# Patient Record
Sex: FEMALE | Race: WHITE | Employment: OTHER | URBAN - METROPOLITAN AREA
[De-identification: names, ages, dates, MRNs, and addresses within clinical notes are randomized per-mention and may not be internally consistent; named-entity substitution may affect disease eponyms.]

---

## 2021-07-08 ENCOUNTER — APPOINTMENT (OUTPATIENT)
Dept: GENERAL RADIOLOGY | Age: 71
End: 2021-07-08
Payer: MEDICARE

## 2021-07-08 ENCOUNTER — HOSPITAL ENCOUNTER (EMERGENCY)
Age: 71
Discharge: HOME | End: 2021-07-08
Payer: MEDICARE

## 2021-07-08 VITALS
TEMPERATURE: 98.4 F | SYSTOLIC BLOOD PRESSURE: 157 MMHG | HEIGHT: 61 IN | BODY MASS INDEX: 36.84 KG/M2 | DIASTOLIC BLOOD PRESSURE: 84 MMHG | WEIGHT: 195.11 LBS | OXYGEN SATURATION: 97 % | HEART RATE: 87 BPM | RESPIRATION RATE: 16 BRPM

## 2021-07-08 DIAGNOSIS — S61.422A LACERATION OF LEFT HAND WITH FOREIGN BODY, INITIAL ENCOUNTER: Primary | ICD-10-CM

## 2021-07-08 PROCEDURE — 73130 X-RAY EXAM OF HAND: CPT

## 2021-07-08 PROCEDURE — 6360000002 HC RX W HCPCS: Performed by: PHYSICIAN ASSISTANT

## 2021-07-08 PROCEDURE — 6370000000 HC RX 637 (ALT 250 FOR IP): Performed by: PHYSICIAN ASSISTANT

## 2021-07-08 PROCEDURE — 90715 TDAP VACCINE 7 YRS/> IM: CPT | Performed by: PHYSICIAN ASSISTANT

## 2021-07-08 PROCEDURE — 12042 INTMD RPR N-HF/GENIT2.6-7.5: CPT

## 2021-07-08 PROCEDURE — 99283 EMERGENCY DEPT VISIT LOW MDM: CPT

## 2021-07-08 PROCEDURE — 90471 IMMUNIZATION ADMIN: CPT | Performed by: PHYSICIAN ASSISTANT

## 2021-07-08 RX ORDER — HYDROCODONE BITARTRATE AND ACETAMINOPHEN 5; 325 MG/1; MG/1
1 TABLET ORAL ONCE
Status: COMPLETED | OUTPATIENT
Start: 2021-07-08 | End: 2021-07-08

## 2021-07-08 RX ORDER — LIDOCAINE HYDROCHLORIDE 20 MG/ML
15 SOLUTION OROPHARYNGEAL ONCE
Status: COMPLETED | OUTPATIENT
Start: 2021-07-08 | End: 2021-07-08

## 2021-07-08 RX ADMIN — TETANUS TOXOID, REDUCED DIPHTHERIA TOXOID AND ACELLULAR PERTUSSIS VACCINE, ADSORBED 0.5 ML: 5; 2.5; 8; 8; 2.5 SUSPENSION INTRAMUSCULAR at 18:24

## 2021-07-08 RX ADMIN — HYDROCODONE BITARTRATE AND ACETAMINOPHEN 1 TABLET: 5; 325 TABLET ORAL at 17:24

## 2021-07-08 RX ADMIN — LIDOCAINE HYDROCHLORIDE 15 ML: 20 SOLUTION ORAL; TOPICAL at 17:31

## 2021-07-08 ASSESSMENT — PAIN DESCRIPTION - ORIENTATION: ORIENTATION: LEFT

## 2021-07-08 ASSESSMENT — PAIN DESCRIPTION - DESCRIPTORS: DESCRIPTORS: ACHING

## 2021-07-08 ASSESSMENT — PAIN SCALES - GENERAL
PAINLEVEL_OUTOF10: 2
PAINLEVEL_OUTOF10: 2

## 2021-07-08 ASSESSMENT — PAIN DESCRIPTION - PAIN TYPE: TYPE: ACUTE PAIN

## 2021-07-08 ASSESSMENT — PAIN DESCRIPTION - LOCATION: LOCATION: HAND

## 2021-07-08 NOTE — ED PROVIDER NOTES
**ADVANCED PRACTICE PROVIDER, I HAVE EVALUATED THIS PATIENT**        [address] [facility] ENCOUNTER      Pt Name: Pao Cedillo  LTS:[id number]  [place] 1950  Date of evaluation: 7/8/2021  Provider: Yoshi Gamez PA-C      Chief Complaint:    Chief Complaint   Patient presents with    Laceration     laceration top of lf hand today  hand hit side of garage wall when backing car in         Nursing Notes, Past Medical Hx, Past Surgical Hx, Social Hx, Allergies, and Family Hx were all reviewed and agreed with or any disagreements were addressed in the HPI.    HPI: (Location, Duration, Timing, Severity, Quality, Assoc Sx, Context, Modifying factors)    Chief Complaint of left dorsal hand laceration    This is a  69 y.o. female who presents indicating the accidental injury to the dorsal portion of left hand was about an hour ago. She states that she was backing up her vehicle into the garage, reached out through the window to grab the mirror to pull it in and unfortunately got her hand stuck between the mirror and the stucco interior of the garage. She states that it was just momentary and then she was able to pull the vehicle forward again to get her handout but in the meantime she has moderately severe achy constant nonradiating sharp local pain to the dorsal left hand with increasing swelling over the course of the last hour. No medication taken for the tenderness so far. There is some mild seeping blood and a bit of a skin tear or cat as well. No finger numbness or tingling or acute loss range of motion or strength. No wrist pain or tenderness elbow pain or shoulder pain. No radiating pain. Positive for recent tetanus within the last 5 years. PastMedical/Surgical History:  No pertinent previous surgery to this area.     Patient mentions that she has an allergy to latex and to codeine but has taken Vicodin with no difficulty in the weakness. Coordination: Coordination normal.      Gait: Gait normal.   Psychiatric:         Mood and Affect: Mood normal.         Behavior: Behavior normal.         Thought Content: Thought content normal.         Judgment: Judgment normal.         MEDICAL DECISION MAKING    Vitals:    Vitals:    07/08/21 1700   BP: (!) 157/84   Pulse: 87   Resp: 16   Temp: 98.4 °F (36.9 °C)   SpO2: 97%   Weight: 195 lb 1.7 oz (88.5 kg)   Height: 5' 1\" (1.549 m)       LABS:Labs Reviewed - No data to display     Remainder of labs reviewed and were negative at this time or not returned at the time of this note. RADIOLOGY:   Non-plain film images such as CT, Ultrasound and MRI are read by the radiologist. Tavo Veronica PA-C have directly visualized the radiologic plain film image(s) with the below findings:      Interpretation per the Radiologist below, if available at the time of this note:    XR HAND LEFT (MIN 3 VIEWS)   Final Result   Mild osteoarthritic changes throughout the digits and diffuse osteopenia with   no acute bony abnormality      Moderate soft tissue swelling along the dorsum of the hand extending distally   with punctate calcifications or possible foreign body seen just beneath the   skin in the area. Recommend clinical follow-up. No results found. MEDICAL DECISION MAKING / ED COURSE:      PROCEDURES:   Lac Repair    Date/Time: 7/8/2021 6:22 PM  Performed by: Melinda Singh PA-C  Authorized by: Melinda Singh PA-C     Consent:     Consent obtained:  Verbal    Consent given by:  Patient  Anesthesia (see MAR for exact dosages):      Anesthesia method:  Topical application and local infiltration    Local anesthetic:  Lidocaine 1% WITH epi  Laceration details:     Location:  Hand    Hand location:  L hand, dorsum    Length (cm):  5    Depth (mm):  2  Pre-procedure details:     Preparation:  Patient was prepped and draped in usual sterile fashion and imaging obtained to evaluate for foreign bodies  Exploration:     Hemostasis achieved with:  Direct pressure    Wound exploration: wound explored through full range of motion and entire depth of wound probed and visualized      Wound extent: areolar tissue violated, foreign bodies/material and vascular damage      Wound extent: no fascia violation noted, no muscle damage noted, no nerve damage noted, no tendon damage noted and no underlying fracture noted      Foreign bodies/material:  A number of small grains of sand/stucco particles were gently debrided from under the skin as well as picked out with forceps    Contaminated: yes    Treatment:     Area cleansed with:  Hibiclens and saline    Amount of cleaning:  Extensive    Visualized foreign bodies/material removed: yes    Skin repair:     Repair method:  Sutures and Steri-Strips    Suture size:  6-0    Suture material:  Prolene    Suture technique:  Simple interrupted    Number of sutures:  5    Number of Steri-Strips:  4  Approximation:     Approximation:  Close  Post-procedure details:     Dressing:  Non-adherent dressing    Patient tolerance of procedure: Tolerated well, no immediate complications        None    Patient was given:  Medications   Tetanus-Diphth-Acell Pertussis (BOOSTRIX) injection 0.5 mL (has no administration in time range)   HYDROcodone-acetaminophen (NORCO) 5-325 MG per tablet 1 tablet (1 tablet Oral Given 7/8/21 1724)   lidocaine viscous hcl (XYLOCAINE) 2 % solution 15 mL (15 mLs Mouth/Throat Given 7/8/21 1731)     This patient presents as above and evaluation and treatment is begun including some medication given for comfort. Imaging obtained with results as above. Tetanus shot updated. Excellent wound care given for this patient with very good results of foreign bodies removed, skin very well approximated and sutured and Steri-Stripped in place.   Conservative home care is recommended at this time and patient verbalizes understanding and agreement with the above and the following discharge home plan. The patient tolerated their visit well. I evaluated the patient. The physician was available for consultation as needed. The patient and / or the family were informed of the results of any tests, a time was given to answer questions, a plan was proposed and they agreed with plan. Home in good condition to keep the area clean dry and covered with new bandage a couple times daily as needed for the first few days.  Then monitor for gradual improvement.  May also do cold compresses the first couple of days and then switch to warm compresses to help with bruise reabsorption.  Sutures need removed in about 10 days.  Return to the emergency department for any emergency worsen or concern. CLINICAL IMPRESSION:  1.  Laceration of left hand with foreign body, initial encounter        DISPOSITION Decision To Discharge 07/08/2021 06:16:03 PM      PATIENT REFERRED TO:  [facility]) Pre-Services  [phone]    With your primary care provider in about 10 days for suture removal      DISCHARGE MEDICATIONS:  New Prescriptions    No medications on file       DISCONTINUED MEDICATIONS:  Discontinued Medications    No medications on file              (Please note the MDM and HPI sections of this note were completed with a voice recognition program.  Efforts were made to edit the dictations but occasionally words are mis-transcribed.)    Electronically signed, Carline Jackson PA-C,          Carline Jackson PA-C  07/08/21 5361

## 2021-07-08 NOTE — ED NOTES
Pt  harshad suturing of laceration 1\" top of lf hand   Adaptic dry sterile dressing applied     Ana M Cuellar RN  07/08/21 0511

## 2022-09-21 ENCOUNTER — HOSPITAL ENCOUNTER (OUTPATIENT)
Age: 72
Setting detail: OUTPATIENT SURGERY
Discharge: HOME | End: 2022-09-21
Attending: INTERNAL MEDICINE | Admitting: INTERNAL MEDICINE
Payer: MEDICARE

## 2022-09-21 ENCOUNTER — ANESTHESIA EVENT (OUTPATIENT)
Dept: ENDOSCOPY | Age: 72
End: 2022-09-21
Payer: MEDICARE

## 2022-09-21 ENCOUNTER — ANESTHESIA (OUTPATIENT)
Dept: ENDOSCOPY | Age: 72
End: 2022-09-21
Payer: MEDICARE

## 2022-09-21 VITALS
HEIGHT: 61 IN | BODY MASS INDEX: 37.19 KG/M2 | RESPIRATION RATE: 16 BRPM | WEIGHT: 197 LBS | HEART RATE: 67 BPM | OXYGEN SATURATION: 97 % | DIASTOLIC BLOOD PRESSURE: 71 MMHG | SYSTOLIC BLOOD PRESSURE: 124 MMHG | TEMPERATURE: 97.3 F

## 2022-09-21 DIAGNOSIS — Z12.11 SCREEN FOR COLON CANCER: ICD-10-CM

## 2022-09-21 PROCEDURE — 88305 TISSUE EXAM BY PATHOLOGIST: CPT

## 2022-09-21 PROCEDURE — 7100000010 HC PHASE II RECOVERY - FIRST 15 MIN: Performed by: INTERNAL MEDICINE

## 2022-09-21 PROCEDURE — 2709999900 HC NON-CHARGEABLE SUPPLY: Performed by: INTERNAL MEDICINE

## 2022-09-21 PROCEDURE — 3700000001 HC ADD 15 MINUTES (ANESTHESIA): Performed by: INTERNAL MEDICINE

## 2022-09-21 PROCEDURE — 3609010600 HC COLONOSCOPY POLYPECTOMY SNARE/COLD BIOPSY: Performed by: INTERNAL MEDICINE

## 2022-09-21 PROCEDURE — 2500000003 HC RX 250 WO HCPCS

## 2022-09-21 PROCEDURE — 2580000003 HC RX 258: Performed by: STUDENT IN AN ORGANIZED HEALTH CARE EDUCATION/TRAINING PROGRAM

## 2022-09-21 PROCEDURE — 3700000000 HC ANESTHESIA ATTENDED CARE: Performed by: INTERNAL MEDICINE

## 2022-09-21 PROCEDURE — 6360000002 HC RX W HCPCS

## 2022-09-21 PROCEDURE — 7100000011 HC PHASE II RECOVERY - ADDTL 15 MIN: Performed by: INTERNAL MEDICINE

## 2022-09-21 RX ORDER — SODIUM CHLORIDE 9 MG/ML
INJECTION, SOLUTION INTRAVENOUS CONTINUOUS
Status: DISCONTINUED | OUTPATIENT
Start: 2022-09-21 | End: 2022-09-21 | Stop reason: HOSPADM

## 2022-09-21 RX ORDER — SODIUM CHLORIDE 0.9 % (FLUSH) 0.9 %
5-40 SYRINGE (ML) INJECTION EVERY 12 HOURS SCHEDULED
Status: DISCONTINUED | OUTPATIENT
Start: 2022-09-21 | End: 2022-09-21 | Stop reason: HOSPADM

## 2022-09-21 RX ORDER — CELECOXIB 200 MG/1
200 CAPSULE ORAL 2 TIMES DAILY
COMMUNITY
Start: 2021-11-02

## 2022-09-21 RX ORDER — LISINOPRIL 10 MG/1
TABLET ORAL
COMMUNITY
Start: 2022-09-02

## 2022-09-21 RX ORDER — FERROUS SULFATE 325(65) MG
325 TABLET ORAL 2 TIMES DAILY
COMMUNITY
Start: 2021-11-02

## 2022-09-21 RX ORDER — ATORVASTATIN CALCIUM 20 MG/1
TABLET, FILM COATED ORAL
COMMUNITY
Start: 2022-09-07

## 2022-09-21 RX ORDER — SODIUM CHLORIDE 9 MG/ML
INJECTION, SOLUTION INTRAVENOUS PRN
Status: DISCONTINUED | OUTPATIENT
Start: 2022-09-21 | End: 2022-09-21 | Stop reason: HOSPADM

## 2022-09-21 RX ORDER — METHOCARBAMOL 750 MG/1
750 TABLET, FILM COATED ORAL 3 TIMES DAILY PRN
COMMUNITY
Start: 2021-11-18

## 2022-09-21 RX ORDER — LIDOCAINE HYDROCHLORIDE 20 MG/ML
INJECTION, SOLUTION EPIDURAL; INFILTRATION; INTRACAUDAL; PERINEURAL PRN
Status: DISCONTINUED | OUTPATIENT
Start: 2022-09-21 | End: 2022-09-21 | Stop reason: SDUPTHER

## 2022-09-21 RX ORDER — LIDOCAINE 50 MG/G
1 PATCH TOPICAL EVERY 24 HOURS
COMMUNITY
Start: 2021-12-16

## 2022-09-21 RX ORDER — POLYETHYLENE GLYCOL 3350 17 G/17G
17 POWDER, FOR SOLUTION ORAL NIGHTLY
COMMUNITY

## 2022-09-21 RX ORDER — ACETAMINOPHEN 500 MG
1000 TABLET ORAL EVERY 8 HOURS PRN
COMMUNITY
Start: 2020-12-18

## 2022-09-21 RX ORDER — LISINOPRIL 10 MG/1
TABLET ORAL
COMMUNITY
Start: 2021-06-07

## 2022-09-21 RX ORDER — PROPOFOL 10 MG/ML
INJECTION, EMULSION INTRAVENOUS PRN
Status: DISCONTINUED | OUTPATIENT
Start: 2022-09-21 | End: 2022-09-21 | Stop reason: SDUPTHER

## 2022-09-21 RX ORDER — MELOXICAM 15 MG/1
15 TABLET ORAL DAILY
COMMUNITY
Start: 2021-04-22

## 2022-09-21 RX ORDER — ONDANSETRON 4 MG/1
4 TABLET, FILM COATED ORAL EVERY 8 HOURS PRN
COMMUNITY
Start: 2021-11-18

## 2022-09-21 RX ORDER — ATORVASTATIN CALCIUM 20 MG/1
TABLET, FILM COATED ORAL
COMMUNITY
Start: 2020-12-04

## 2022-09-21 RX ORDER — SODIUM CHLORIDE 0.9 % (FLUSH) 0.9 %
5-40 SYRINGE (ML) INJECTION PRN
Status: DISCONTINUED | OUTPATIENT
Start: 2022-09-21 | End: 2022-09-21 | Stop reason: HOSPADM

## 2022-09-21 RX ADMIN — PROPOFOL 20 MG: 10 INJECTION, EMULSION INTRAVENOUS at 14:33

## 2022-09-21 RX ADMIN — PROPOFOL 20 MG: 10 INJECTION, EMULSION INTRAVENOUS at 14:39

## 2022-09-21 RX ADMIN — PROPOFOL 20 MG: 10 INJECTION, EMULSION INTRAVENOUS at 14:45

## 2022-09-21 RX ADMIN — PROPOFOL 50 MG: 10 INJECTION, EMULSION INTRAVENOUS at 14:26

## 2022-09-21 RX ADMIN — PROPOFOL 30 MG: 10 INJECTION, EMULSION INTRAVENOUS at 14:29

## 2022-09-21 RX ADMIN — PROPOFOL 80 MG: 10 INJECTION, EMULSION INTRAVENOUS at 14:16

## 2022-09-21 RX ADMIN — PROPOFOL 20 MG: 10 INJECTION, EMULSION INTRAVENOUS at 14:36

## 2022-09-21 RX ADMIN — LIDOCAINE HYDROCHLORIDE 100 MG: 20 INJECTION, SOLUTION EPIDURAL; INFILTRATION; INTRACAUDAL; PERINEURAL at 14:16

## 2022-09-21 RX ADMIN — PROPOFOL 50 MG: 10 INJECTION, EMULSION INTRAVENOUS at 14:31

## 2022-09-21 RX ADMIN — SODIUM CHLORIDE: 9 INJECTION, SOLUTION INTRAVENOUS at 13:50

## 2022-09-21 RX ADMIN — PROPOFOL 20 MG: 10 INJECTION, EMULSION INTRAVENOUS at 14:42

## 2022-09-21 ASSESSMENT — PAIN - FUNCTIONAL ASSESSMENT: PAIN_FUNCTIONAL_ASSESSMENT: 0-10

## 2022-09-21 ASSESSMENT — PAIN SCALES - GENERAL
PAINLEVEL_OUTOF10: 0
PAINLEVEL_OUTOF10: 0

## 2022-09-21 NOTE — OP NOTE
Colonoscopy Procedure Note      Patient: Mookie Begum  : 1950  Acct#:     Procedure: Colonoscopy with cold snare polypectomy x 1    Date:  2022    Surgeon:  Kathryn Diaz MD    Referring Physician:  Troy He MD    Preoperative Diagnosis:  history of colon polyps, last colonoscopy  with removal of a descending colon sessile serrated adenoma and a 10 mm pedunculated tubular adenoma from the rectosigmoid colon    Postoperative Diagnosis:    1) moderate left-sided diverticulosis  2) redundant colon prohibiting deep cecal intubation--about 2/3 of cecum visualized  3) small polyp ascending colon resected with cold snare    Consent:  The patient or their legal guardian has signed a consent, and is aware of the potential risks, benefits, alternatives, and potential complications of this procedure. These include, but are not limited to hemorrhage, bleeding, post procedural pain, perforation, phlebitis, aspiration, hypotension, hypoxia, cardiovascular events such as arryhthmia, and possibly death. Additionally, the possibility of missed colonic polyps and interval colon cancer was discussed in the consent. Anesthesia:  MAC    Procedure: An informed consent was obtained from the patient after explanation of indications, benefits, possible risks and complications of the procedure. The patient was then taken to the endoscopy suite, placed in the left lateral decubitus position, and the above IV anesthesia was administered. The Olympus video colonoscope was placed in the patient's rectum under digital direction and advanced to the ileocecal valve. The cecum was identified by characteristic anatomy. The preparation was adequate after cleaning. The ileocecal valve was identified. The scope was then withdrawn back through the cecum, ascending, transverse, descending, sigmoid colon, and rectum.   Careful circumferential examination of the mucosa in these areas demonstrated:    Digital rectal exam was normal. Moderate left-sided diverticulosis was seen. Despite manual pressure and supine position we could not achieve deep cecal intubation. We were able to see bout 2/3 of the cecum from the level of the ileocecal valve. There was a 3 mm ascending colon polyp resected with cold snare and retrieved. Retroflex view in the rectum showed internal hemorrhoids. The colon was decompressed and the scope was withdrawn from the patient. The patient tolerated the procedure well and was taken to the PACU in good condition. Estimated Blood Loss (mL): <24 mL    Complications: None    Impression:    1) moderate left-sided diverticulosis  2) redundant colon prohibiting deep cecal intubation--about 2/3 of cecum visualized  3) small polyp ascending colon resected with cold snare    Recommendations:  Await pathology. Consider repeat colonoscopy in five years based on overall health  High fiber diet.     Radha Guajardo MD  [facility]  9/21/2022  [phone]

## 2022-09-21 NOTE — H&P
[facility]   Pre-operative History and Physical    Patient: Sweta Willard  : 1950  Acct#:     HISTORY OF PRESENT ILLNESS:    The patient is a 66 y.o. female who presents for colonoscopy    Indications: history of colon polyps, last colonoscopy  with removal of a descending colon sessile serrated adenoma and a 10 mm pedunculated tubular adenoma from the rectosigmoid colon    Past Medical History:        Diagnosis Date    Arthritis     Hyperlipidemia     Hypertension       Past Surgical History:        Procedure Laterality Date    BACK SURGERY      COLONOSCOPY      JOINT REPLACEMENT Right 2021      Medications Prior to Admission:   No current facility-administered medications on file prior to encounter.      Current Outpatient Medications on File Prior to Encounter   Medication Sig Dispense Refill    lisinopril (PRINIVIL;ZESTRIL) 10 MG tablet TAKE 1 TABLET DAILY      lidocaine (LIDODERM) 5 % Place 1 patch onto the skin every 24 hours (Patient not taking: Reported on 2022)      ferrous sulfate (IRON 325) 325 (65 Fe) MG tablet Take 325 mg by mouth 2 times daily (Patient not taking: Reported on 2022)      celecoxib (CELEBREX) 200 MG capsule Take 200 mg by mouth 2 times daily (Patient not taking: Reported on 2022)      atorvastatin (LIPITOR) 20 MG tablet TAKE 1 TABLET DAILY FOR    CHOLESTEROL      acetaminophen (TYLENOL) 500 MG tablet Take 1,000 mg by mouth every 8 hours as needed      meloxicam (MOBIC) 15 MG tablet Take 15 mg by mouth daily (Patient not taking: Reported on 2022)      methocarbamol (ROBAXIN) 750 MG tablet Take 750 mg by mouth 3 times daily as needed (Patient not taking: Reported on 2022)      ondansetron (ZOFRAN) 4 MG tablet Take 4 mg by mouth every 8 hours as needed (Patient not taking: Reported on 2022)      lisinopril (PRINIVIL;ZESTRIL) 10 MG tablet       atorvastatin (LIPITOR) 20 MG tablet       polyethylene glycol (GLYCOLAX) 17 GM/SCOOP powder Take 17 g by mouth nightly          Allergies:  Latex and Codeine    Social History:   Social History     Socioeconomic History    Marital status: Single     Spouse name: Not on file    Number of children: Not on file    Years of education: Not on file    Highest education level: Not on file   Occupational History    Not on file   Tobacco Use    Smoking status: Never     Passive exposure: Never    Smokeless tobacco: Never   Vaping Use    Vaping Use: Never used   Substance and Sexual Activity    Alcohol use: Yes     Comment: wine    Drug use: Not on file    Sexual activity: Not on file   Other Topics Concern    Not on file   Social History Narrative    Not on file     Social Determinants of Health     Financial Resource Strain: Not on file   Food Insecurity: Not on file   Transportation Needs: Not on file   Physical Activity: Not on file   Stress: Not on file   Social Connections: Not on file   Intimate Partner Violence: Not on file   Housing Stability: Not on file      Family History:   History reviewed. No pertinent family history. PHYSICAL EXAM:      BP (!) 174/82   Pulse (!) 106   Temp 97.7 °F (36.5 °C) (Temporal)   Resp 18   Ht 5' 1\" (1.549 m)   Wt 197 lb (89.4 kg)   SpO2 98%   BMI 37.22 kg/m²  I        Heart:  normal    Lungs:  No increased work of breathing, good air exchange, clear to auscultation bilaterally, no crackles or wheezing    Abdomen:  No scars, normal bowel sounds, soft, non-distended, non-tender, no masses palpated, no hepatosplenomegally      ASA Class  ASA 2 - Patient with mild systemic disease with no functional limitations    Mallampati Class: II      ASSESSMENT AND PLAN:    1. Patient is a suitable candidate for endoscopic procedure and attendant anesthesia  2. Risks, benefits, alternatives of procedure discussed in detail with patient including risks of bleeding, infection, perforation, risks of sedation, risks of missed lesions. The patient wishes to proceed.

## 2022-09-21 NOTE — ANESTHESIA POSTPROCEDURE EVALUATION
Department of Anesthesiology  Postprocedure Note    Patient: Carvel Dense  MRN: [id number]  YOB: 1950  Date of evaluation: 9/21/2022      Procedure Summary     Date: 09/21/22 Room / Location: [address] [place] [address]    Anesthesia Start: 1058 Anesthesia Stop: 9334    Procedure: COLONOSCOPY POLYPECTOMY SNARE/COLD BIOPSY Diagnosis:       Screen for colon cancer      (Screen for colon cancer)    Surgeons: Susanna Nelson MD Responsible Provider: Daniel Walls MD    Anesthesia Type: MAC ASA Status: 3          Anesthesia Type: MAC    Rishi Phase I: Rishi Score: 10    Rishi Phase II:        Anesthesia Post Evaluation    Patient location during evaluation: PACU  Patient participation: complete - patient participated  Level of consciousness: awake  Airway patency: patent  Nausea & Vomiting: no nausea and no vomiting  Complications: no  Cardiovascular status: hemodynamically stable and blood pressure returned to baseline  Respiratory status: spontaneous ventilation and nonlabored ventilation  Hydration status: stable  Comments: Ms. Elizabeth Cheema was seen resting comfortably following procedure. Appropriate for discharge home with .

## 2022-09-21 NOTE — DISCHARGE INSTRUCTIONS
require coordination or balance for 24 hours. Otherwise, return to your normal routine as soon as you are comfortable to do so, which is usually the next day after the procedure. Medications - When taking medications, it's important to: Take your medication as directed, not more, not less, not at a different time. Do not stop taking them without consulting your healthcare provider. Don't share them with anyone else. Know what effects and side effects to expect, and report them to your healthcare provider. If you are taking more than one drug, even if it is an over-the-counter medication, herb, or dietary supplement, be sure to check with a physician or pharmacist about drug interactions. Plan ahead for refills so you don't run out. Lifestyle Changes - The results of your colonoscopy will determine if any lifestyle changes are necessary. Follow-up:  The doctor will usually give you a preliminary report after the medication wears off and you are more alert. The results from a biopsy can take as long as 1-2 weeks to be completed. Schedule a follow-up appointment as directed by your doctor. You should schedule a follow-up colonoscopy as recommended by your doctor. Call Your Doctor If Any of the Following Occurs:  Bleeding from your rectum; notify your doctor if you pass a teaspoonful or more of blood   Black, tarry stools   Severe abdominal pain   Hard, swollen abdomen   Signs of infection, including fever or chills   Inability to pass gas or stool   Coughing, shortness of breath, chest pain, severe nausea or vomiting     In case of an emergency, call 911 immediately. Alia Marroquin MD    With questions or concerns call Dr Carmen Zhou at .

## 2022-09-21 NOTE — ANESTHESIA PRE PROCEDURE
Department of Anesthesiology  Preprocedure Note       Name:  Radha Gutierrez   Age:  66 y.o.  :  1950                                          MRN:  [id number]         Date:  2022      Surgeon: Zenia Brady):  Danielle Finn MD    Procedure: Procedure(s):  COLORECTAL CANCER SCREENING, NOT HIGH RISK    Medications prior to admission:   Prior to Admission medications    Medication Sig Start Date End Date Taking?  Authorizing Provider   lisinopril (PRINIVIL;ZESTRIL) 10 MG tablet TAKE 1 TABLET DAILY 21  Yes Historical Provider, MD   lidocaine (LIDODERM) 5 % Place 1 patch onto the skin every 24 hours 21  Yes Historical Provider, MD   ferrous sulfate (IRON 325) 325 (65 Fe) MG tablet Take 325 mg by mouth 2 times daily 21  Yes Historical Provider, MD   celecoxib (CELEBREX) 200 MG capsule Take 200 mg by mouth 2 times daily 21  Yes Historical Provider, MD   atorvastatin (LIPITOR) 20 MG tablet TAKE 1 TABLET DAILY FOR    CHOLESTEROL 20  Yes Historical Provider, MD   acetaminophen (TYLENOL) 500 MG tablet Take 1,000 mg by mouth every 8 hours as needed 20  Yes Historical Provider, MD   meloxicam (MOBIC) 15 MG tablet Take 15 mg by mouth daily 21  Yes Historical Provider, MD   methocarbamol (ROBAXIN) 750 MG tablet Take 750 mg by mouth 3 times daily as needed 21  Yes Historical Provider, MD   ondansetron (ZOFRAN) 4 MG tablet Take 4 mg by mouth every 8 hours as needed 21  Yes Historical Provider, MD   lisinopril (PRINIVIL;ZESTRIL) 10 MG tablet  22   Historical Provider, MD   atorvastatin (LIPITOR) 20 MG tablet  22   Historical Provider, MD   polyethylene glycol (GLYCOLAX) 17 GM/SCOOP powder Take 17 g by mouth nightly    Historical Provider, MD       Current medications:    Current Facility-Administered Medications   Medication Dose Route Frequency Provider Last Rate Last Admin    0.9 % sodium chloride infusion   IntraVENous Continuous Basim Faye MD       [facility] sodium chloride flush 0.9 % injection 5-40 mL  5-40 mL IntraVENous 2 times per day Amira Liu MD        sodium chloride flush 0.9 % injection 5-40 mL  5-40 mL IntraVENous PRN Amira Liu MD        0.9 % sodium chloride infusion   IntraVENous PRN Amira Liu MD           Allergies: Allergies   Allergen Reactions    Latex     Codeine        Problem List:  There is no problem list on file for this patient. Past Medical History:        Diagnosis Date    Arthritis     Hyperlipidemia     Hypertension        Past Surgical History:        Procedure Laterality Date    BACK SURGERY      COLONOSCOPY      JOINT REPLACEMENT Right 11/19/2021       Social History:    Social History     Tobacco Use    Smoking status: Never     Passive exposure: Never    Smokeless tobacco: Never   Substance Use Topics    Alcohol use: Yes     Comment: wine                                Counseling given: Not Answered      Vital Signs (Current): There were no vitals filed for this visit. BP Readings from Last 3 Encounters:   07/08/21 (!) 157/84       NPO Status:                                                                                 BMI:   Wt Readings from Last 3 Encounters:   07/08/21 195 lb 1.7 oz (88.5 kg)     There is no height or weight on file to calculate BMI.    CBC: No results found for: WBC, RBC, HGB, HCT, MCV, RDW, PLT    CMP: No results found for: NA, K, CL, CO2, BUN, CREATININE, GFRAA, AGRATIO, LABGLOM, GLUCOSE, GLU, PROT, CALCIUM, BILITOT, ALKPHOS, AST, ALT    POC Tests: No results for input(s): POCGLU, POCNA, POCK, POCCL, POCBUN, POCHEMO, POCHCT in the last 72 hours.     Coags: No results found for: PROTIME, INR, APTT    HCG (If Applicable): No results found for: PREGTESTUR, PREGSERUM, HCG, HCGQUANT     ABGs: No results found for: PHART, PO2ART, DCB8TXY, NWY5VPY, BEART, Q5GVJTBT     Type & Screen (If Applicable):  No results found for: LABABO, [address]    Drug/Infectious Status (If Applicable):  No results found for: HIV, HEPCAB    COVID-19 Screening (If Applicable): No results found for: COVID19        Anesthesia Evaluation   no history of anesthetic complications:   Airway: Mallampati: III     Neck ROM: limited  Comment: History of lumbar surgery. Increased neck circumference. Generalized shoulder and neck stiffness  Mouth opening: < 3 FB   Dental:      Comment: Fair dentition, denies loose teeth    Pulmonary:       (-) COPD, asthma, rhonchi, wheezes and rales                           Cardiovascular:    (+) hypertension:, hyperlipidemia    (-) orthopnea,  RONQUILLO and peripheral edema        Rate: abnormal                 ROS comment: EKG:  Statements   Sinus bradycardia, otherwise normal ekg. There is no previous tracing for comparison     NM Stress Test (2021): Impression:   1. No definitive evidence of inducible ischemia or infarction. 2. Normal LV function with an LVEF of >70%. PE comment: tachycardia   Neuro/Psych:      (-) seizures, TIA and CVA            ROS comment: Chronic back pain s/p surgery GI/Hepatic/Renal:   (+) bowel prep,      (-) liver disease and no renal diseaseMorbid obesity: BMI: 37.       Endo/Other:        (-) diabetes mellitus (HgbA1c: 5.6%), blood dyscrasia               Abdominal:   (+) obese (BMI: 37),           Vascular: Other Findings: Will place on monitor to verify sinus tachycardia. Anesthesia Plan      MAC     ASA 3     (Add-on, not on schedule from Dr. Michael Spencer office. NPO appropriate by report. Ms. Brooklynn Burnett denies active nausea / reflux. Reports adequate prep.)        Anesthetic plan and risks discussed with patient. Plan discussed with CRNA. This pre-anesthesia assessment may be used as a history and physical.    DOS STAFF ADDENDUM:    Pt seen and examined, chart reviewed (including anesthesia, drug and allergy history). No interval changes to history and physical examination.

## 2025-03-29 ENCOUNTER — OFFICE VISIT (OUTPATIENT)
Age: 75
End: 2025-03-29

## 2025-03-29 VITALS
HEART RATE: 126 BPM | BODY MASS INDEX: 38.51 KG/M2 | HEIGHT: 61 IN | DIASTOLIC BLOOD PRESSURE: 78 MMHG | SYSTOLIC BLOOD PRESSURE: 121 MMHG | TEMPERATURE: 98.3 F | OXYGEN SATURATION: 98 % | WEIGHT: 204 LBS

## 2025-03-29 DIAGNOSIS — R31.9 URINARY TRACT INFECTION WITH HEMATURIA, SITE UNSPECIFIED: Primary | ICD-10-CM

## 2025-03-29 DIAGNOSIS — R30.0 PAINFUL URGING TO URINATE: ICD-10-CM

## 2025-03-29 DIAGNOSIS — N39.0 URINARY TRACT INFECTION WITH HEMATURIA, SITE UNSPECIFIED: Primary | ICD-10-CM

## 2025-03-29 LAB
BILIRUBIN, POC: NORMAL
BLOOD URINE, POC: NORMAL
CLARITY, POC: CLEAR
COLOR, POC: NORMAL
GLUCOSE URINE, POC: NORMAL MG/DL
KETONES, POC: NORMAL MG/DL
LEUKOCYTE EST, POC: NORMAL
NITRITE, POC: NORMAL
PH, POC: 5.5
PROTEIN, POC: 30 MG/DL
SPECIFIC GRAVITY, POC: 1
UROBILINOGEN, POC: 0.2 MG/DL

## 2025-03-29 RX ORDER — SULFAMETHOXAZOLE AND TRIMETHOPRIM 800; 160 MG/1; MG/1
1 TABLET ORAL 2 TIMES DAILY
Qty: 14 TABLET | Refills: 0 | Status: SHIPPED | OUTPATIENT
Start: 2025-03-29 | End: 2025-04-05

## 2025-03-29 NOTE — PATIENT INSTRUCTIONS
Please take medication as prescribed   We will call you with urine culture results and we will adjust treatment plan as needed.

## 2025-03-29 NOTE — PROGRESS NOTES
Sandra Couch (:  1950) is a 74 y.o. female,New patient, here for evaluation of the following chief complaint(s):  Urinary Tract Infection (Urgency of having to use restroom since 1 o clock today.)      ASSESSMENT/PLAN:    ICD-10-CM    1. Urinary tract infection with hematuria, site unspecified  N39.0 sulfamethoxazole-trimethoprim (BACTRIM DS) 800-160 MG per tablet    R31.9       2. Painful urging to urinate  R30.0 Culture, Urine     POCT Urinalysis no Micro     sulfamethoxazole-trimethoprim (BACTRIM DS) 800-160 MG per tablet          Patient presents for urinary urgency  POCT UA: negative nitrites, large leukocytes, large blood  Rx bactrim (she states that this worked for her in the past)  Will send for urine culture  Please take medication as prescribed   We will call you with urine culture results and we will adjust treatment plan as needed.     SUBJECTIVE/OBJECTIVE:    History provided by:  Patient   used: No      HPI:   74 y.o. female presents with symptoms of urinary frequency ongoing since earlier today. She states that this is typically how her UTI's start. No aggravating or alleviating factors. Has not taken medications for symptoms.     Vitals:    25   BP: 121/78   BP Site: Right Upper Arm   Patient Position: Sitting   BP Cuff Size: Large Adult   Pulse: (!) 126   Temp: 98.3 °F (36.8 °C)   TempSrc: Oral   SpO2: 98%   Weight: 92.5 kg (204 lb)   Height: 1.549 m (5' 1\")       Review of Systems   Constitutional:  Negative for fatigue and fever.   Respiratory:  Negative for cough, chest tightness and shortness of breath.    Cardiovascular:  Negative for chest pain.   Gastrointestinal:  Negative for abdominal pain, constipation, diarrhea, nausea and vomiting.   Genitourinary:  Positive for frequency and urgency. Negative for difficulty urinating, dysuria, enuresis, vaginal discharge and vaginal pain.       Physical Exam  Vitals and nursing note reviewed.

## 2025-04-01 LAB
BACTERIA UR CULT: ABNORMAL
ORGANISM: ABNORMAL

## 2025-04-02 ENCOUNTER — RESULTS FOLLOW-UP (OUTPATIENT)
Age: 75
End: 2025-04-02

## (undated) DEVICE — SNARE VASC L240CM LOOP W10MM SHTH DIA2.4MM RND STIFF CLD